# Patient Record
Sex: MALE | Race: WHITE
[De-identification: names, ages, dates, MRNs, and addresses within clinical notes are randomized per-mention and may not be internally consistent; named-entity substitution may affect disease eponyms.]

---

## 2018-07-07 ENCOUNTER — HOSPITAL ENCOUNTER (EMERGENCY)
Dept: HOSPITAL 11 - JP.ED | Age: 53
Discharge: HOME | End: 2018-07-07
Payer: COMMERCIAL
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
## 2018-07-07 DIAGNOSIS — S76.101A: Primary | ICD-10-CM

## 2018-07-07 DIAGNOSIS — F17.210: ICD-10-CM

## 2018-07-07 DIAGNOSIS — Z79.899: ICD-10-CM

## 2018-07-07 DIAGNOSIS — Z79.82: ICD-10-CM
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
## 2018-07-07 NOTE — EDM.PDOC
ED HPI GENERAL MEDICAL PROBLEM





- General


Chief Complaint: Lower Extremity Injury/Pain


Stated Complaint: RIGHT THIGH PAIN/INJURY


Time Seen by Provider: 07/07/18 21:19


Source of Information: Reports: Patient


History Limitations: Reports: No Limitations





- History of Present Illness


INITIAL COMMENTS - FREE TEXT/NARRATIVE: 





This man jumped off a pontoon and felt a pop in the right quadriceps area.  The 

pain has steadily gotten worse.  He says he's unable to walk on it due to pain.


  ** Right Leg


Pain Score (Numeric/FACES): 5





- Related Data


 Allergies











Allergy/AdvReac Type Severity Reaction Status Date / Time


 


No Known Allergies Allergy   Verified 07/07/18 20:57











Home Meds: 


 Home Meds





Aspirin [Halfprin] 162 mg PO DAILY 07/07/18 [History]


FA/Lycopene/Lut/MV,Ca,Iron,Min [Centrum] 1 tab PO DAILY 07/07/18 [History]


Lisinopril [Zestril] 5 mg PO DAILY 07/07/18 [History]


Metoprolol Succinate [Toprol XL] 25 mg PO DAILY 07/07/18 [History]


atorvaSTATin [Lipitor] 80 mg PO BEDTIME 07/07/18 [History]


metFORMIN [Glucophage] 2,000 mg PO DAILY 07/07/18 [History]











Past Medical History


Cardiovascular History: Reports: High Cholesterol, MI, Stents


Endocrine/Metabolic History: Reports: Diabetes, Type II





- Past Surgical History


HEENT Surgical History: Reports: Cataract Surgery





Social & Family History





- Tobacco Use


Smoking Status *Q: Current Every Day Smoker


Years of Tobacco use: 38


Packs/Tins Daily: 0.5


Used Tobacco, but Quit: No


Second Hand Smoke Exposure: No





- Caffeine Use


Caffeine Use: Reports: Coffee, Soda





- Recreational Drug Use


Recreational Drug Use: No





Review of Systems





- Review of Systems


Review Of Systems: ROS reveals no pertinent complaints other than HPI.





ED EXAM, GENERAL





- Physical Exam


Exam: See Below


Exam Limited By: No Limitations


General Appearance: Alert, WD/WN, Mild Distress


Extremities: Other (tenderness to medial margin of right quadriceps muscle in 

mid thigh.  Seems a little soft in this area but no definite muscle defect 

palpable.)





Course





- Vital Signs


Last Recorded V/S: 


 Last Vital Signs











Temp  36.1 C   07/07/18 20:56


 


Pulse  76   07/07/18 20:56


 


Resp  16   07/07/18 20:56


 


BP  175/94 H  07/07/18 20:56


 


Pulse Ox  99   07/07/18 20:56














- Re-Assessments/Exams


Free Text/Narrative Re-Assessment/Exam: 





07/07/18 21:53


Discussed warmup and stretching prior to any strenuous activity.





Departure





- Departure


Time of Disposition: 21:42


Disposition: Home, Self-Care 01


Condition: Fair


Clinical Impression: 


 Injury to thigh muscle or tendon








- Discharge Information


Instructions:  Muscle Pain, Adult


Referrals: 


PCP,None [Primary Care Provider] - 


Forms:  ED Department Discharge


Additional Instructions: 


Rest, elevate, apply ice for next 1-2 days.  Use crutches .weight bearing as 

tolerated.


For pain use Percocet 5/325 (#20) 1 or 2 tabs every 4 hours as needed.  May 

cause sedation and impair driving.  May be habit forming.


Healing will take place over the next 2-3 weeks.


Gentle muscle stretching recommended.